# Patient Record
Sex: FEMALE | Race: BLACK OR AFRICAN AMERICAN | NOT HISPANIC OR LATINO | URBAN - METROPOLITAN AREA
[De-identification: names, ages, dates, MRNs, and addresses within clinical notes are randomized per-mention and may not be internally consistent; named-entity substitution may affect disease eponyms.]

---

## 2020-08-21 LAB
CHLAMYDIA TRACHOMATAS DNA PROBE: NEGATIVE
NEISSERIA GONORRHOEAE (GC) CULTURE: NEGATIVE
TRICHOMONAS VAGINALIS BY NAA: NEGATIVE

## 2023-08-08 PROBLEM — Z76.89 ENCOUNTER TO ESTABLISH CARE: Status: ACTIVE | Noted: 2023-08-08

## 2023-08-22 NOTE — PROGRESS NOTES
Date: 08/28/2023  Patient ID: [id number]   Chief Complaint: New Pt (Est care)    HPI:   Rome Nguyen is a 23 y.o. female who is here today to establish care and wellness visit  Patient has h/o depression x 3yrs. Does see therapist. Has not tried medications yet but would like to change environment. Also has anxiety thoughts. Does have HI thoughts when driving and denies SI thoughts. Denies plans. Does see OBGYN for bc patch but eventually would like to switch to depo shot - tolerated before. Has not taken patch in the last month.    Past Medical History:   Diagnosis Date    Depression     Marijuana use 8/28/2023     History reviewed. No pertinent surgical history.  Review of patient's allergies indicates:  No Known Allergies  Outpatient Medications Marked as Taking for the 8/28/23 encounter (Office Visit) with Antonia Ng MD   Medication Sig Dispense Refill    [DISCONTINUED] norelgestromin-ethinyl estradiol 150-35 mcg/24 hr Place 1 patch onto the skin once a week.       History reviewed. No pertinent family history.   Social History     Socioeconomic History    Marital status: Single   Occupational History    Occupation: registered  at [employer]   Tobacco Use    Smoking status: Never    Smokeless tobacco: Never   Substance and Sexual Activity    Alcohol use: Yes     Alcohol/week: 1.0 standard drink of alcohol     Types: 1 Glasses of wine per week    Drug use: Yes     Types: Marijuana     Comment: 30 min/day    Sexual activity: Yes     Partners: Female, Male     Comment: currently men     Social Determinants of Health     Financial Resource Strain: High Risk (8/28/2023)    Overall Financial Resource Strain (CARDIA)     Difficulty of Paying Living Expenses: Hard   Food Insecurity: Food Insecurity Present (8/28/2023)    Hunger Vital Sign     Worried About Running Out of Food in the Last Year: Sometimes true     Ran Out of Food in the Last Year: Sometimes true   Transportation Needs: No Transportation  Needs (8/28/2023)    PRAPARE - Transportation     Lack of Transportation (Medical): No     Lack of Transportation (Non-Medical): No   Physical Activity: Sufficiently Active (8/28/2023)    Exercise Vital Sign     Days of Exercise per Week: 5 days     Minutes of Exercise per Session: 150+ min   Stress: Stress Concern Present (8/28/2023)    [employer] - Occupational Stress Questionnaire     Feeling of Stress : To some extent   Social Connections: Moderately Isolated (8/28/2023)    Social Connection and Isolation Panel [NHANES]     Frequency of Communication with Friends and Family: More than three times a week     Frequency of Social Gatherings with Friends and Family: Once a week     Attends Yazdanism Services: 1 to 4 times per year     Active Member of Clubs or Organizations: No     Attends Club or Organization Meetings: Never     Marital Status: Never    Housing Stability: Low Risk  (8/28/2023)    Housing Stability Vital Sign     Unable to Pay for Housing in the Last Year: No     Number of Places Lived in the Last Year: 1     Unstable Housing in the Last Year: No     Patient Care Team:  Antonia Ng MD as PCP - General (Family Medicine)   Subjective:   Review of Systems   Constitutional: Negative.  Negative for fever and weight loss.   HENT:  Negative for congestion, hearing loss and sore throat.    Eyes:  Negative for blurred vision.   Respiratory:  Negative for cough and shortness of breath.    Cardiovascular:  Negative for chest pain, palpitations and leg swelling.   Gastrointestinal:  Negative for abdominal pain, blood in stool, constipation, diarrhea, nausea and vomiting.   Genitourinary:  Negative for dysuria, frequency, hematuria and urgency.   Musculoskeletal:  Negative for joint pain.   Skin:  Negative for rash.   Neurological:  Negative for weakness and headaches.   Psychiatric/Behavioral:  Positive for depression. Negative for suicidal ideas. The patient is not  "nervous/anxious and does not have insomnia.      See HPI for details  All Other ROS: Negative except as stated in HPI  Objective:   /75   Pulse 76   Temp 98.8 °F (37.1 °C)   Ht 5' 6" (1.676 m)   Wt 61.2 kg (135 lb)   LMP 08/18/2023   SpO2 98%   BMI 21.79 kg/m²   Physical Exam  Vitals reviewed.   Constitutional:       Appearance: Normal appearance.   HENT:      Head: Normocephalic and atraumatic.      Right Ear: Tympanic membrane, ear canal and external ear normal.      Left Ear: Tympanic membrane, ear canal and external ear normal.      Nose: Nose normal. No congestion or rhinorrhea.      Mouth/Throat:      Mouth: Mucous membranes are moist.      Pharynx: Oropharynx is clear.   Eyes:      General: No scleral icterus.     Extraocular Movements: Extraocular movements intact.      Conjunctiva/sclera: Conjunctivae normal.      Comments: Wears glasses   Cardiovascular:      Rate and Rhythm: Normal rate and regular rhythm.      Pulses: Normal pulses.      Heart sounds: Normal heart sounds.   Pulmonary:      Effort: Pulmonary effort is normal.      Breath sounds: Normal breath sounds.   Abdominal:      General: Abdomen is flat. Bowel sounds are normal.      Palpations: Abdomen is soft.      Tenderness: There is no abdominal tenderness.   Musculoskeletal:         General: No swelling or deformity. Normal range of motion.      Cervical back: Normal range of motion and neck supple.   Skin:     General: Skin is warm and dry.   Neurological:      Mental Status: She is alert and oriented to person, place, and time.   Psychiatric:         Mood and Affect: Mood normal.         Behavior: Behavior normal.         Thought Content: Thought content normal.         Judgment: Judgment normal.       Assessment:       ICD-10-CM ICD-9-CM   1. Encounter to establish care  Z76.89 V65.8   2. Uses birth control  Z78.9 V49.89   3. Depression, unspecified depression type  F32.A 311   4. Marijuana use  F12.90 305.20      Plan:   1. " Encounter to establish care  Overview:  Obtain routine labs  F/u for wellness      Orders:  -     CBC Auto Differential; Future; Expected date: 08/28/2023  -     Comprehensive Metabolic Panel; Future; Expected date: 08/28/2023  -     Lipid Panel; Future; Expected date: 08/28/2023  -     TSH; Future; Expected date: 08/28/2023  -     Hemoglobin A1C; Future; Expected date: 08/28/2023  -     Urinalysis; Future; Expected date: 08/28/2023  -     T4, Free; Future; Expected date: 08/28/2023  -     Vitamin D; Future; Expected date: 08/28/2023  -     Hepatitis C Antibody; Future; Expected date: 08/28/2023  -     HIV 1/2 Ag/Ab (4th Gen); Future; Expected date: 08/28/2023    2. Uses birth control  Overview:  Stop patch  Start Depo injections q3mos  Call if issues arise    Orders:  -     medroxyPROGESTERone (DEPO-PROVERA) 150 mg/mL Syrg; Inject 1 mL (150 mg total) into the muscle every 3 (three) months.  Dispense: 1 mL; Refill: 0    3. Depression, unspecified depression type  Overview:  Currently unmedicated  Continue CBT  Exercise daily. Get sunlight daily.  Practice positive phrases and repeat throughout the day, along with yoga and relaxation techniques.  Establish good social support, make changes to reduce stress.  Report any symptoms of suicidal/homicidal ideations or self harm immediately, if clinic is closed go to nearest emergency room.          4. Marijuana use  Overview:  Counseled patient on marijuana cessation and risks of continued use for 3-5min                  Follow up in about 1 year (around 8/28/2024) for Wellness. In addition to their scheduled follow up, the patient has also been instructed to follow up on as needed basis.

## 2023-08-28 ENCOUNTER — OFFICE VISIT (OUTPATIENT)
Dept: PRIMARY CARE CLINIC | Facility: CLINIC | Age: 23
End: 2023-08-28
Payer: COMMERCIAL

## 2023-08-28 VITALS
BODY MASS INDEX: 21.69 KG/M2 | HEART RATE: 76 BPM | TEMPERATURE: 99 F | DIASTOLIC BLOOD PRESSURE: 75 MMHG | SYSTOLIC BLOOD PRESSURE: 120 MMHG | HEIGHT: 66 IN | OXYGEN SATURATION: 98 % | WEIGHT: 135 LBS

## 2023-08-28 DIAGNOSIS — F32.A DEPRESSION, UNSPECIFIED DEPRESSION TYPE: ICD-10-CM

## 2023-08-28 DIAGNOSIS — F12.90 MARIJUANA USE: ICD-10-CM

## 2023-08-28 DIAGNOSIS — Z76.89 ENCOUNTER TO ESTABLISH CARE: Primary | ICD-10-CM

## 2023-08-28 DIAGNOSIS — Z78.9 USES BIRTH CONTROL: ICD-10-CM

## 2023-08-28 PROCEDURE — 99385 PREV VISIT NEW AGE 18-39: CPT | Mod: ,,, | Performed by: STUDENT IN AN ORGANIZED HEALTH CARE EDUCATION/TRAINING PROGRAM

## 2023-08-28 PROCEDURE — 1160F RVW MEDS BY RX/DR IN RCRD: CPT | Mod: CPTII,,, | Performed by: STUDENT IN AN ORGANIZED HEALTH CARE EDUCATION/TRAINING PROGRAM

## 2023-08-28 PROCEDURE — 1159F MED LIST DOCD IN RCRD: CPT | Mod: CPTII,,, | Performed by: STUDENT IN AN ORGANIZED HEALTH CARE EDUCATION/TRAINING PROGRAM

## 2023-08-28 PROCEDURE — 3008F BODY MASS INDEX DOCD: CPT | Mod: CPTII,,, | Performed by: STUDENT IN AN ORGANIZED HEALTH CARE EDUCATION/TRAINING PROGRAM

## 2023-08-28 PROCEDURE — 1159F PR MEDICATION LIST DOCUMENTED IN MEDICAL RECORD: ICD-10-PCS | Mod: CPTII,,, | Performed by: STUDENT IN AN ORGANIZED HEALTH CARE EDUCATION/TRAINING PROGRAM

## 2023-08-28 PROCEDURE — G9016 PR DEMO-SMOKING CESSATION COUN: ICD-10-PCS | Mod: ,,, | Performed by: STUDENT IN AN ORGANIZED HEALTH CARE EDUCATION/TRAINING PROGRAM

## 2023-08-28 PROCEDURE — G9016 DEMO-SMOKING CESSATION COUN: HCPCS | Mod: ,,, | Performed by: STUDENT IN AN ORGANIZED HEALTH CARE EDUCATION/TRAINING PROGRAM

## 2023-08-28 PROCEDURE — 3078F DIAST BP <80 MM HG: CPT | Mod: CPTII,,, | Performed by: STUDENT IN AN ORGANIZED HEALTH CARE EDUCATION/TRAINING PROGRAM

## 2023-08-28 PROCEDURE — 3074F SYST BP LT 130 MM HG: CPT | Mod: CPTII,,, | Performed by: STUDENT IN AN ORGANIZED HEALTH CARE EDUCATION/TRAINING PROGRAM

## 2023-08-28 PROCEDURE — 1160F PR REVIEW ALL MEDS BY PRESCRIBER/CLIN PHARMACIST DOCUMENTED: ICD-10-PCS | Mod: CPTII,,, | Performed by: STUDENT IN AN ORGANIZED HEALTH CARE EDUCATION/TRAINING PROGRAM

## 2023-08-28 PROCEDURE — 99385 PR PREVENTIVE VISIT,NEW,18-39: ICD-10-PCS | Mod: ,,, | Performed by: STUDENT IN AN ORGANIZED HEALTH CARE EDUCATION/TRAINING PROGRAM

## 2023-08-28 PROCEDURE — 3008F PR BODY MASS INDEX (BMI) DOCUMENTED: ICD-10-PCS | Mod: CPTII,,, | Performed by: STUDENT IN AN ORGANIZED HEALTH CARE EDUCATION/TRAINING PROGRAM

## 2023-08-28 PROCEDURE — 3074F PR MOST RECENT SYSTOLIC BLOOD PRESSURE < 130 MM HG: ICD-10-PCS | Mod: CPTII,,, | Performed by: STUDENT IN AN ORGANIZED HEALTH CARE EDUCATION/TRAINING PROGRAM

## 2023-08-28 PROCEDURE — 3078F PR MOST RECENT DIASTOLIC BLOOD PRESSURE < 80 MM HG: ICD-10-PCS | Mod: CPTII,,, | Performed by: STUDENT IN AN ORGANIZED HEALTH CARE EDUCATION/TRAINING PROGRAM

## 2023-08-28 RX ORDER — NORELGESTROMIN AND ETHINYL ESTRADIOL 35; 150 UG/MG; UG/MG
1 PATCH TRANSDERMAL WEEKLY
COMMUNITY
End: 2023-08-28

## 2023-08-28 RX ORDER — MEDROXYPROGESTERONE ACETATE 150 MG/ML
150 INJECTION, SUSPENSION INTRAMUSCULAR
Qty: 1 ML | Refills: 0 | Status: SHIPPED | OUTPATIENT
Start: 2023-08-28 | End: 2023-12-18 | Stop reason: SDUPTHER

## 2023-10-26 ENCOUNTER — OFFICE VISIT (OUTPATIENT)
Dept: PRIMARY CARE CLINIC | Facility: CLINIC | Age: 23
End: 2023-10-26
Payer: COMMERCIAL

## 2023-10-26 VITALS
DIASTOLIC BLOOD PRESSURE: 74 MMHG | WEIGHT: 129.81 LBS | TEMPERATURE: 99 F | BODY MASS INDEX: 20.86 KG/M2 | HEIGHT: 66 IN | OXYGEN SATURATION: 98 % | SYSTOLIC BLOOD PRESSURE: 113 MMHG | HEART RATE: 79 BPM

## 2023-10-26 DIAGNOSIS — F32.A DEPRESSION, UNSPECIFIED DEPRESSION TYPE: Primary | ICD-10-CM

## 2023-10-26 PROCEDURE — 3074F SYST BP LT 130 MM HG: CPT | Mod: CPTII,,, | Performed by: STUDENT IN AN ORGANIZED HEALTH CARE EDUCATION/TRAINING PROGRAM

## 2023-10-26 PROCEDURE — 99214 OFFICE O/P EST MOD 30 MIN: CPT | Mod: ,,, | Performed by: STUDENT IN AN ORGANIZED HEALTH CARE EDUCATION/TRAINING PROGRAM

## 2023-10-26 PROCEDURE — 3008F PR BODY MASS INDEX (BMI) DOCUMENTED: ICD-10-PCS | Mod: CPTII,,, | Performed by: STUDENT IN AN ORGANIZED HEALTH CARE EDUCATION/TRAINING PROGRAM

## 2023-10-26 PROCEDURE — 1159F MED LIST DOCD IN RCRD: CPT | Mod: CPTII,,, | Performed by: STUDENT IN AN ORGANIZED HEALTH CARE EDUCATION/TRAINING PROGRAM

## 2023-10-26 PROCEDURE — 99214 PR OFFICE/OUTPT VISIT, EST, LEVL IV, 30-39 MIN: ICD-10-PCS | Mod: ,,, | Performed by: STUDENT IN AN ORGANIZED HEALTH CARE EDUCATION/TRAINING PROGRAM

## 2023-10-26 PROCEDURE — 3008F BODY MASS INDEX DOCD: CPT | Mod: CPTII,,, | Performed by: STUDENT IN AN ORGANIZED HEALTH CARE EDUCATION/TRAINING PROGRAM

## 2023-10-26 PROCEDURE — 1160F PR REVIEW ALL MEDS BY PRESCRIBER/CLIN PHARMACIST DOCUMENTED: ICD-10-PCS | Mod: CPTII,,, | Performed by: STUDENT IN AN ORGANIZED HEALTH CARE EDUCATION/TRAINING PROGRAM

## 2023-10-26 PROCEDURE — 3078F PR MOST RECENT DIASTOLIC BLOOD PRESSURE < 80 MM HG: ICD-10-PCS | Mod: CPTII,,, | Performed by: STUDENT IN AN ORGANIZED HEALTH CARE EDUCATION/TRAINING PROGRAM

## 2023-10-26 PROCEDURE — 1160F RVW MEDS BY RX/DR IN RCRD: CPT | Mod: CPTII,,, | Performed by: STUDENT IN AN ORGANIZED HEALTH CARE EDUCATION/TRAINING PROGRAM

## 2023-10-26 PROCEDURE — 3074F PR MOST RECENT SYSTOLIC BLOOD PRESSURE < 130 MM HG: ICD-10-PCS | Mod: CPTII,,, | Performed by: STUDENT IN AN ORGANIZED HEALTH CARE EDUCATION/TRAINING PROGRAM

## 2023-10-26 PROCEDURE — 1159F PR MEDICATION LIST DOCUMENTED IN MEDICAL RECORD: ICD-10-PCS | Mod: CPTII,,, | Performed by: STUDENT IN AN ORGANIZED HEALTH CARE EDUCATION/TRAINING PROGRAM

## 2023-10-26 PROCEDURE — 3078F DIAST BP <80 MM HG: CPT | Mod: CPTII,,, | Performed by: STUDENT IN AN ORGANIZED HEALTH CARE EDUCATION/TRAINING PROGRAM

## 2023-10-26 RX ORDER — SERTRALINE HYDROCHLORIDE 25 MG/1
25 TABLET, FILM COATED ORAL DAILY
Qty: 30 TABLET | Refills: 11 | Status: SHIPPED | OUTPATIENT
Start: 2023-10-26 | End: 2024-10-25

## 2023-10-26 NOTE — PROGRESS NOTES
Date: 10/26/2023  Patient ID: [id number]   Chief Complaint: Depression (Having issues with depression for about 3 years)    HPI:   Rome Nguyen is a 23 y.o. female here today for Depression (Having issues with depression for about 3 years)  Patient has had anxiety depression over the last 3yrs since pandemic and grandmother passing, which was well controlled until recently, when patient started having passive SI thoughts. Also feels very apathetic and would like to start medication. She denies plan or HI. Denies bipolar d/o or bruno with her or family    Past Medical History:   Diagnosis Date    Depression     Marijuana use 8/28/2023     Outpatient Medications Marked as Taking for the 10/26/23 encounter (Office Visit) with Antonia Ng MD   Medication Sig Dispense Refill    medroxyPROGESTERone (DEPO-PROVERA) 150 mg/mL Syrg Inject 1 mL (150 mg total) into the muscle every 3 (three) months. 1 mL 0     History reviewed. No pertinent surgical history.  Review of patient's allergies indicates:  No Known Allergies  History reviewed. No pertinent family history.   Social History     Socioeconomic History    Marital status: Single   Occupational History    Occupation: registered  at [employer]   Tobacco Use    Smoking status: Never    Smokeless tobacco: Never   Substance and Sexual Activity    Alcohol use: Yes     Alcohol/week: 1.0 standard drink of alcohol     Types: 1 Glasses of wine per week    Drug use: Yes     Types: Marijuana     Comment: 30 min/day    Sexual activity: Yes     Partners: Female, Male     Comment: currently men     Social Determinants of Health     Financial Resource Strain: High Risk (8/28/2023)    Overall Financial Resource Strain (CARDIA)     Difficulty of Paying Living Expenses: Hard   Food Insecurity: Food Insecurity Present (8/28/2023)    Hunger Vital Sign     Worried About Running Out of Food in the Last Year: Sometimes true     Ran Out of Food in the Last Year: Sometimes true  "  Transportation Needs: No Transportation Needs (8/28/2023)    PRAPARE - Transportation     Lack of Transportation (Medical): No     Lack of Transportation (Non-Medical): No   Physical Activity: Sufficiently Active (8/28/2023)    Exercise Vital Sign     Days of Exercise per Week: 5 days     Minutes of Exercise per Session: 150+ min   Stress: Stress Concern Present (8/28/2023)    [employer] [place] [employer] - Occupational Stress Questionnaire     Feeling of Stress : To some extent   Social Connections: Moderately Isolated (8/28/2023)    Social Connection and Isolation Panel [NHANES]     Frequency of Communication with Friends and Family: More than three times a week     Frequency of Social Gatherings with Friends and Family: Once a week     Attends Sikh Services: 1 to 4 times per year     Active Member of Clubs or Organizations: No     Attends Club or Organization Meetings: Never     Marital Status: Never    Housing Stability: Low Risk  (8/28/2023)    Housing Stability Vital Sign     Unable to Pay for Housing in the Last Year: No     Number of Places Lived in the Last Year: 1     Unstable Housing in the Last Year: No     Patient Care Team:  Antonia Ng MD as PCP - General (Family Medicine)   Subjective:   ROS  See HPI for details  All Other ROS: Negative except as stated in HPI.   Objective:   /74   Pulse 79   Temp 98.7 °F (37.1 °C)   Ht 5' 6" (1.676 m)   Wt 58.9 kg (129 lb 12.8 oz)   SpO2 98%   BMI 20.95 kg/m²   Physical Exam  General: NAD  Eye: EOMI  HENT: no nasal discharge  Respiratory: non-labored breathing  Musculoskeletal: ambulates independently. No obvious deformity  Integumentary: no apparent discoloration  Neurologic: Alert, oriented to person and situation  Cognition and Speech: Speech clear and coherent.   Psychiatric: Cooperative  Assessment:       ICD-10-CM ICD-9-CM   1. Depression, unspecified depression type  F32.A 311      Plan:   1. Depression, unspecified " depression type  Overview:  Start Zoloft 25mg qd. AE discussed  Continue CBT  Exercise daily. Get sunlight daily.  Practice positive phrases and repeat throughout the day, along with yoga and relaxation techniques.  Establish good social support, make changes to reduce stress.  Report any symptoms of suicidal/homicidal ideations or self harm immediately, if clinic is closed go to nearest emergency room.        Orders:  -     sertraline (ZOLOFT) 25 MG tablet; Take 1 tablet (25 mg total) by mouth once daily.  Dispense: 30 tablet; Refill: 11         Medication List with Changes/Refills   New Medications    SERTRALINE (ZOLOFT) 25 MG TABLET    Take 1 tablet (25 mg total) by mouth once daily.       Start Date: 10/26/2023End Date: 10/25/2024   Current Medications    MEDROXYPROGESTERONE (DEPO-PROVERA) 150 MG/ML SYRG    Inject 1 mL (150 mg total) into the muscle every 3 (three) months.       Start Date: 8/28/2023 End Date: --        Follow up in about 6 weeks (around 12/7/2023) for Depression. In addition to their scheduled follow up, the patient has also been instructed to follow up on as needed basis.

## 2023-10-28 LAB
CHLAMYDIA TRACHOMATIS (PRECISION): NEGATIVE
NEISSERIA GONORRHOEAE (PRECISION): NEGATIVE
PAP RECOMMENDATION EXT: NORMAL
PAP SMEAR: NORMAL
TRICHOMONAS VAGINALIS (PRECISION): NEGATIVE

## 2023-11-28 PROBLEM — F33.9 DEPRESSION, RECURRENT: Status: ACTIVE | Noted: 2023-08-28

## 2023-12-14 NOTE — PROGRESS NOTES
Date: 12/18/2023  Patient ID: [id number]   Chief Complaint: Follow-up (Follow up on medication zoloft, states hasn't really noticed a difference)    HPI:   Rome Nguyen is a 23 y.o. female here today for Follow-up (Follow up on medication zoloft, states hasn't really noticed a difference)  Last visit Zoloft was started for depression. Notices more energy and improved mood/depression. Denies SI/HI/hallucinations However, energy is still increased at night and gets to sleep around 2am and wakes up 2-3x throughout the night. Able to fall asleep easily though. Takes Zoloft 8-9am.    Also, patient has congestion intermittently. Tried Mucinex, and other otc meds. Denies sore throat. Works with kids.Denies fevers, chills, chest pain, sob    Past Medical History:   Diagnosis Date    Depression     Marijuana use 8/28/2023     Outpatient Medications Marked as Taking for the 12/18/23 encounter (Office Visit) with Antonia Ng MD   Medication Sig Dispense Refill    sertraline (ZOLOFT) 25 MG tablet Take 1 tablet (25 mg total) by mouth once daily. 30 tablet 11    [DISCONTINUED] medroxyPROGESTERone (DEPO-PROVERA) 150 mg/mL Syrg Inject 1 mL (150 mg total) into the muscle every 3 (three) months. 1 mL 0     History reviewed. No pertinent surgical history.  Review of patient's allergies indicates:  No Known Allergies  History reviewed. No pertinent family history.   Social History     Socioeconomic History    Marital status: Single   Occupational History    Occupation: registered  at [employer]   Tobacco Use    Smoking status: Never    Smokeless tobacco: Never   Substance and Sexual Activity    Alcohol use: Yes     Alcohol/week: 1.0 standard drink of alcohol     Types: 1 Glasses of wine per week    Drug use: Yes     Types: Marijuana     Comment: 30 min/day    Sexual activity: Yes     Partners: Female, Male     Comment: currently men     Social Determinants of Health     Financial Resource Strain: High Risk (8/28/2023)  "   Overall Financial Resource Strain (CARDIA)     Difficulty of Paying Living Expenses: Hard   Food Insecurity: Food Insecurity Present (8/28/2023)    Hunger Vital Sign     Worried About Running Out of Food in the Last Year: Sometimes true     Ran Out of Food in the Last Year: Sometimes true   Transportation Needs: No Transportation Needs (8/28/2023)    PRAPARE - Transportation     Lack of Transportation (Medical): No     Lack of Transportation (Non-Medical): No   Physical Activity: Sufficiently Active (8/28/2023)    Exercise Vital Sign     Days of Exercise per Week: 5 days     Minutes of Exercise per Session: 150+ min   Stress: Stress Concern Present (8/28/2023)    [employer] [place] [employer] - Occupational Stress Questionnaire     Feeling of Stress : To some extent   Social Connections: Moderately Isolated (8/28/2023)    Social Connection and Isolation Panel [NHANES]     Frequency of Communication with Friends and Family: More than three times a week     Frequency of Social Gatherings with Friends and Family: Once a week     Attends Jewish Services: 1 to 4 times per year     Active Member of Clubs or Organizations: No     Attends Club or Organization Meetings: Never     Marital Status: Never    Housing Stability: Low Risk  (8/28/2023)    Housing Stability Vital Sign     Unable to Pay for Housing in the Last Year: No     Number of Places Lived in the Last Year: 1     Unstable Housing in the Last Year: No     Patient Care Team:  Antonia Ng MD as PCP - General (Family Medicine)   Subjective:   ROS  See HPI for details  All Other ROS: Negative except as stated in HPI.   Objective:   /79   Pulse 67   Temp 98.7 °F (37.1 °C)   Ht 5' 6" (1.676 m)   Wt 59.1 kg (130 lb 6.4 oz)   SpO2 99%   BMI 21.05 kg/m²   Physical Exam  HENT:      Right Ear: Tympanic membrane and ear canal normal. There is no impacted cerumen.      Left Ear: Tympanic membrane and ear canal normal. There is no " impacted cerumen.      Nose: Congestion present. No rhinorrhea.      Mouth/Throat:      Comments: Cobblestoning in pharynx  Cardiovascular:      Rate and Rhythm: Normal rate and regular rhythm.      Heart sounds: Normal heart sounds.   Pulmonary:      Effort: Pulmonary effort is normal.      Breath sounds: Normal breath sounds. No wheezing or rhonchi.   Lymphadenopathy:      Cervical: No cervical adenopathy.   Neurological:      Mental Status: She is alert and oriented to person, place, and time.   Psychiatric:         Mood and Affect: Mood normal.         Behavior: Behavior normal.         Thought Content: Thought content normal.         Judgment: Judgment normal.       Assessment:       ICD-10-CM ICD-9-CM   1. Depression, recurrent  F33.9 296.30   2. Insomnia, unspecified type  G47.00 780.52   3. Uses birth control  Z78.9 V49.89   4. Postnasal drip  R09.82 784.91      Plan:   1. Depression, recurrent  Assessment & Plan:  Attempt Melatonin at night   Consider switching Zoloft at night   Consider switching to lexapro if above does not help with insomnia      2. Insomnia, unspecified type  Assessment & Plan:  Melatonin prn  Go to bed at same night every night, get up same time every day, avoid sleeping in  Keep temperature comfortable  Keep bedroom quiet and dark when sleeping  No tv or screen 1 hour before bed  Relaxation techniques: Meditation, progressive muscle relaxation  Regular exercise every day  Warm herbal tea at night  Avoid caffeine and overstimulated activities after 4pm / prior to bedtime  Avoid alcohol  Avoid drinking excessive fluids prior to sleep      Orders:  -     melatonin (MELATIN) 5 mg; Take 1 tablet (5 mg total) by mouth every evening.  Dispense: 30 tablet; Refill: 3    3. Uses birth control  Assessment & Plan:  Stable. Continue depo q3mos    Orders:  -     medroxyPROGESTERone (DEPO-PROVERA) 150 mg/mL Syrg; Inject 1 mL (150 mg total) into the muscle every 3 (three) months.  Dispense: 1 mL;  Refill: 6    4. Postnasal drip  Assessment & Plan:  Start Flonase and Claritin. Call if sx worsen    Orders:  -     fluticasone propionate (FLONASE) 50 mcg/actuation nasal spray; 1 spray (50 mcg total) by Each Nostril route once daily.  Dispense: 18.2 g; Refill: 6  -     loratadine (CLARITIN) 10 mg tablet; Take 1 tablet (10 mg total) by mouth once daily.  Dispense: 90 tablet; Refill: 3         Medication List with Changes/Refills   New Medications    FLUTICASONE PROPIONATE (FLONASE) 50 MCG/ACTUATION NASAL SPRAY    1 spray (50 mcg total) by Each Nostril route once daily.       Start Date: 12/18/2023End Date: --    LORATADINE (CLARITIN) 10 MG TABLET    Take 1 tablet (10 mg total) by mouth once daily.       Start Date: 12/18/2023End Date: 12/17/2024    MELATONIN (MELATIN) 5 MG    Take 1 tablet (5 mg total) by mouth every evening.       Start Date: 12/18/2023End Date: --   Current Medications    SERTRALINE (ZOLOFT) 25 MG TABLET    Take 1 tablet (25 mg total) by mouth once daily.       Start Date: 10/26/2023End Date: 10/25/2024   Changed and/or Refilled Medications    Modified Medication Previous Medication    MEDROXYPROGESTERONE (DEPO-PROVERA) 150 MG/ML SYRG medroxyPROGESTERone (DEPO-PROVERA) 150 mg/mL Syrg       Inject 1 mL (150 mg total) into the muscle every 3 (three) months.    Inject 1 mL (150 mg total) into the muscle every 3 (three) months.       Start Date: 12/18/2023End Date: --    Start Date: 8/28/2023 End Date: 12/18/2023        Follow up in about 3 weeks (around 1/8/2024) for telemed if pt wants, Depression. In addition to their scheduled follow up, the patient has also been instructed to follow up on as needed basis.

## 2023-12-18 ENCOUNTER — OFFICE VISIT (OUTPATIENT)
Dept: PRIMARY CARE CLINIC | Facility: CLINIC | Age: 23
End: 2023-12-18
Payer: COMMERCIAL

## 2023-12-18 VITALS
DIASTOLIC BLOOD PRESSURE: 79 MMHG | HEART RATE: 67 BPM | WEIGHT: 130.38 LBS | SYSTOLIC BLOOD PRESSURE: 116 MMHG | OXYGEN SATURATION: 99 % | BODY MASS INDEX: 20.95 KG/M2 | HEIGHT: 66 IN | TEMPERATURE: 99 F

## 2023-12-18 DIAGNOSIS — Z78.9 USES BIRTH CONTROL: ICD-10-CM

## 2023-12-18 DIAGNOSIS — R09.82 POSTNASAL DRIP: ICD-10-CM

## 2023-12-18 DIAGNOSIS — F33.9 DEPRESSION, RECURRENT: Primary | ICD-10-CM

## 2023-12-18 DIAGNOSIS — G47.00 INSOMNIA, UNSPECIFIED TYPE: ICD-10-CM

## 2023-12-18 PROCEDURE — 99214 PR OFFICE/OUTPT VISIT, EST, LEVL IV, 30-39 MIN: ICD-10-PCS | Mod: ,,, | Performed by: STUDENT IN AN ORGANIZED HEALTH CARE EDUCATION/TRAINING PROGRAM

## 2023-12-18 PROCEDURE — 1159F MED LIST DOCD IN RCRD: CPT | Mod: CPTII,,, | Performed by: STUDENT IN AN ORGANIZED HEALTH CARE EDUCATION/TRAINING PROGRAM

## 2023-12-18 PROCEDURE — 3074F PR MOST RECENT SYSTOLIC BLOOD PRESSURE < 130 MM HG: ICD-10-PCS | Mod: CPTII,,, | Performed by: STUDENT IN AN ORGANIZED HEALTH CARE EDUCATION/TRAINING PROGRAM

## 2023-12-18 PROCEDURE — 3078F PR MOST RECENT DIASTOLIC BLOOD PRESSURE < 80 MM HG: ICD-10-PCS | Mod: CPTII,,, | Performed by: STUDENT IN AN ORGANIZED HEALTH CARE EDUCATION/TRAINING PROGRAM

## 2023-12-18 PROCEDURE — 3078F DIAST BP <80 MM HG: CPT | Mod: CPTII,,, | Performed by: STUDENT IN AN ORGANIZED HEALTH CARE EDUCATION/TRAINING PROGRAM

## 2023-12-18 PROCEDURE — 1160F PR REVIEW ALL MEDS BY PRESCRIBER/CLIN PHARMACIST DOCUMENTED: ICD-10-PCS | Mod: CPTII,,, | Performed by: STUDENT IN AN ORGANIZED HEALTH CARE EDUCATION/TRAINING PROGRAM

## 2023-12-18 PROCEDURE — 1159F PR MEDICATION LIST DOCUMENTED IN MEDICAL RECORD: ICD-10-PCS | Mod: CPTII,,, | Performed by: STUDENT IN AN ORGANIZED HEALTH CARE EDUCATION/TRAINING PROGRAM

## 2023-12-18 PROCEDURE — 1160F RVW MEDS BY RX/DR IN RCRD: CPT | Mod: CPTII,,, | Performed by: STUDENT IN AN ORGANIZED HEALTH CARE EDUCATION/TRAINING PROGRAM

## 2023-12-18 PROCEDURE — 3074F SYST BP LT 130 MM HG: CPT | Mod: CPTII,,, | Performed by: STUDENT IN AN ORGANIZED HEALTH CARE EDUCATION/TRAINING PROGRAM

## 2023-12-18 PROCEDURE — 3008F BODY MASS INDEX DOCD: CPT | Mod: CPTII,,, | Performed by: STUDENT IN AN ORGANIZED HEALTH CARE EDUCATION/TRAINING PROGRAM

## 2023-12-18 PROCEDURE — 99214 OFFICE O/P EST MOD 30 MIN: CPT | Mod: ,,, | Performed by: STUDENT IN AN ORGANIZED HEALTH CARE EDUCATION/TRAINING PROGRAM

## 2023-12-18 PROCEDURE — 3008F PR BODY MASS INDEX (BMI) DOCUMENTED: ICD-10-PCS | Mod: CPTII,,, | Performed by: STUDENT IN AN ORGANIZED HEALTH CARE EDUCATION/TRAINING PROGRAM

## 2023-12-18 RX ORDER — ACETAMINOPHEN 500 MG
6 TABLET ORAL NIGHTLY
Qty: 30 TABLET | Refills: 3 | Status: SHIPPED | OUTPATIENT
Start: 2023-12-18

## 2023-12-18 RX ORDER — FLUTICASONE PROPIONATE 50 MCG
1 SPRAY, SUSPENSION (ML) NASAL DAILY
Qty: 18.2 G | Refills: 6 | Status: SHIPPED | OUTPATIENT
Start: 2023-12-18

## 2023-12-18 RX ORDER — MEDROXYPROGESTERONE ACETATE 150 MG/ML
150 INJECTION, SUSPENSION INTRAMUSCULAR
Qty: 1 ML | Refills: 6 | Status: SHIPPED | OUTPATIENT
Start: 2023-12-18

## 2023-12-18 RX ORDER — LORATADINE 10 MG/1
10 TABLET ORAL DAILY
Qty: 90 TABLET | Refills: 3 | Status: SHIPPED | OUTPATIENT
Start: 2023-12-18 | End: 2024-12-17

## 2023-12-18 NOTE — ASSESSMENT & PLAN NOTE
Melatonin prn  Go to bed at same night every night, get up same time every day, avoid sleeping in  Keep temperature comfortable  Keep bedroom quiet and dark when sleeping  No tv or screen 1 hour before bed  Relaxation techniques: Meditation, progressive muscle relaxation  Regular exercise every day  Warm herbal tea at night  Avoid caffeine and overstimulated activities after 4pm / prior to bedtime  Avoid alcohol  Avoid drinking excessive fluids prior to sleep

## 2023-12-18 NOTE — ASSESSMENT & PLAN NOTE
Attempt Melatonin at night   Consider switching Zoloft at night   Consider switching to lexapro if above does not help with insomnia

## 2024-01-25 ENCOUNTER — PATIENT OUTREACH (OUTPATIENT)
Dept: ADMINISTRATIVE | Facility: HOSPITAL | Age: 24
End: 2024-01-25
Payer: COMMERCIAL

## 2024-01-25 ENCOUNTER — DOCUMENTATION ONLY (OUTPATIENT)
Dept: ADMINISTRATIVE | Facility: HOSPITAL | Age: 24
End: 2024-01-25
Payer: COMMERCIAL

## 2024-01-25 NOTE — PROGRESS NOTES
The following record(s)  below were uploaded for Health Maintenance .    CHLAMYDIA SCREENING  2020      Population [facility]. Pap smear in 2023 records requested.  Dr. ABENA Rico

## 2024-05-14 NOTE — PROGRESS NOTES
Date: 05/16/2024  Patient ID: [id number]   Chief Complaint: Med Change    HPI:   Rome Nguyen is a 23 y.o. female here today for Med Change  Patient had increased Zoloft to 50mg qd, which improved depression and energy. Sleep has improved since above has improved.    Patient Active Problem List   Diagnosis    Encounter to establish care    Uses birth control    Marijuana use    Insomnia    Postnasal drip    Major depressive disorder, single episode, severe     Outpatient Medications Marked as Taking for the 5/16/24 encounter (Office Visit) with Antonia Ng MD   Medication Sig Dispense Refill    medroxyPROGESTERone (DEPO-PROVERA) 150 mg/mL Syrg Inject 1 mL (150 mg total) into the muscle every 3 (three) months. 1 mL 6    melatonin (MELATIN) 5 mg Take 1 tablet (5 mg total) by mouth every evening. 30 tablet 3    [DISCONTINUED] sertraline (ZOLOFT) 25 MG tablet Take 1 tablet (25 mg total) by mouth once daily. 30 tablet 11     Past Medical History:   Diagnosis Date    Depression     Marijuana use 8/28/2023     History reviewed. No pertinent surgical history.  Review of patient's allergies indicates:  No Known Allergies  No family history on file.   Social History     Socioeconomic History    Marital status: Single   Occupational History    Occupation: registered  at [employer]   Tobacco Use    Smoking status: Never    Smokeless tobacco: Never   Substance and Sexual Activity    Alcohol use: Yes     Alcohol/week: 1.0 standard drink of alcohol     Types: 1 Glasses of wine per week    Drug use: Yes     Types: Marijuana     Comment: 30 min/day    Sexual activity: Yes     Partners: Female, Male     Comment: currently men     Social Determinants of Health     Financial Resource Strain: High Risk (8/28/2023)    Overall Financial Resource Strain (CARDIA)     Difficulty of Paying Living Expenses: Hard   Food Insecurity: Food Insecurity Present (8/28/2023)    Hunger Vital Sign     Worried About Running Out of Food  "in the Last Year: Sometimes true     Ran Out of Food in the Last Year: Sometimes true   Transportation Needs: No Transportation Needs (8/28/2023)    PRAPARE - Transportation     Lack of Transportation (Medical): No     Lack of Transportation (Non-Medical): No   Physical Activity: Sufficiently Active (8/28/2023)    Exercise Vital Sign     Days of Exercise per Week: 5 days     Minutes of Exercise per Session: 150+ min   Stress: Stress Concern Present (8/28/2023)    [facility] - Occupational Stress Questionnaire     Feeling of Stress : To some extent   Housing Stability: Low Risk  (8/28/2023)    Housing Stability Vital Sign     Unable to Pay for Housing in the Last Year: No     Number of Places Lived in the Last Year: 1     Unstable Housing in the Last Year: No     Patient Care Team:  Antonia Ng MD as PCP - General (Family Medicine)  MYLA Rico NP as Nurse Practitioner (Obstetrics and Gynecology)   Subjective:   ROS  See HPI for details  All Other ROS: Negative except as stated in HPI.   Objective:   /84 (BP Location: Left arm, Patient Position: Sitting, BP Method: Large (Automatic))   Pulse 81   Temp 98.8 °F (37.1 °C)   Resp 18   Ht 5' 6" (1.676 m)   Wt 59.4 kg (131 lb)   LMP 05/01/2024   SpO2 99%   BMI 21.14 kg/m²   Physical Exam  General: NAD  Eye: EOMI  HENT: no nasal discharge  Respiratory: non-labored breathing  Musculoskeletal: ambulates independently. No obvious deformity  Integumentary: no apparent discoloration  Neurologic: Alert, oriented to person and situation  Cognition and Speech: Speech clear and coherent.   Psychiatric: Cooperative. Smiling    Assessment:       ICD-10-CM ICD-9-CM   1. Major depressive disorder, single episode, severe  F32.2 296.23   2. Wellness examination  Z00.00 V70.0      Plan:   1. Major depressive disorder, single episode, severe  Assessment & Plan:  Significantly improved and stable  Continue Zoloft 50 mg " daily    Orders:  -     sertraline (ZOLOFT) 50 MG tablet; Take 1 tablet (50 mg total) by mouth once daily.  Dispense: 90 tablet; Refill: 3  -     Comprehensive Metabolic Panel; Future; Expected date: 05/16/2024  -     Lipid Panel; Future; Expected date: 05/16/2024    2. Wellness examination  -     Comprehensive Metabolic Panel; Future; Expected date: 05/16/2024  -     Lipid Panel; Future; Expected date: 05/16/2024         Medication List with Changes/Refills   Current Medications    FLUTICASONE PROPIONATE (FLONASE) 50 MCG/ACTUATION NASAL SPRAY    1 spray (50 mcg total) by Each Nostril route once daily.       Start Date: 12/18/2023End Date: --    LORATADINE (CLARITIN) 10 MG TABLET    Take 1 tablet (10 mg total) by mouth once daily.       Start Date: 12/18/2023End Date: 12/17/2024    MEDROXYPROGESTERONE (DEPO-PROVERA) 150 MG/ML SYRG    Inject 1 mL (150 mg total) into the muscle every 3 (three) months.       Start Date: 12/18/2023End Date: --    MELATONIN (MELATIN) 5 MG    Take 1 tablet (5 mg total) by mouth every evening.       Start Date: 12/18/2023End Date: --   Changed and/or Refilled Medications    Modified Medication Previous Medication    SERTRALINE (ZOLOFT) 50 MG TABLET sertraline (ZOLOFT) 25 MG tablet       Take 1 tablet (50 mg total) by mouth once daily.    Take 1 tablet (25 mg total) by mouth once daily.       Start Date: 5/16/2024 End Date: 5/16/2025    Start Date: 10/26/2023End Date: 5/16/2024        Follow up in about 6 months (around 11/16/2024) for Wellness. In addition to their scheduled follow up, the patient has also been instructed to follow up on as needed basis.

## 2024-05-16 ENCOUNTER — OFFICE VISIT (OUTPATIENT)
Dept: PRIMARY CARE CLINIC | Facility: CLINIC | Age: 24
End: 2024-05-16
Payer: COMMERCIAL

## 2024-05-16 VITALS
HEIGHT: 66 IN | WEIGHT: 131 LBS | RESPIRATION RATE: 18 BRPM | HEART RATE: 81 BPM | DIASTOLIC BLOOD PRESSURE: 84 MMHG | SYSTOLIC BLOOD PRESSURE: 127 MMHG | TEMPERATURE: 99 F | BODY MASS INDEX: 21.05 KG/M2 | OXYGEN SATURATION: 99 %

## 2024-05-16 DIAGNOSIS — F32.2 MAJOR DEPRESSIVE DISORDER, SINGLE EPISODE, SEVERE: Primary | ICD-10-CM

## 2024-05-16 DIAGNOSIS — Z00.00 WELLNESS EXAMINATION: ICD-10-CM

## 2024-05-16 PROBLEM — F33.9 DEPRESSION, RECURRENT: Status: RESOLVED | Noted: 2023-08-28 | Resolved: 2024-05-16

## 2024-05-16 PROCEDURE — 1160F RVW MEDS BY RX/DR IN RCRD: CPT | Mod: CPTII,,, | Performed by: STUDENT IN AN ORGANIZED HEALTH CARE EDUCATION/TRAINING PROGRAM

## 2024-05-16 PROCEDURE — 99213 OFFICE O/P EST LOW 20 MIN: CPT | Mod: ,,, | Performed by: STUDENT IN AN ORGANIZED HEALTH CARE EDUCATION/TRAINING PROGRAM

## 2024-05-16 PROCEDURE — 1159F MED LIST DOCD IN RCRD: CPT | Mod: CPTII,,, | Performed by: STUDENT IN AN ORGANIZED HEALTH CARE EDUCATION/TRAINING PROGRAM

## 2024-05-16 PROCEDURE — 3008F BODY MASS INDEX DOCD: CPT | Mod: CPTII,,, | Performed by: STUDENT IN AN ORGANIZED HEALTH CARE EDUCATION/TRAINING PROGRAM

## 2024-05-16 PROCEDURE — 3074F SYST BP LT 130 MM HG: CPT | Mod: CPTII,,, | Performed by: STUDENT IN AN ORGANIZED HEALTH CARE EDUCATION/TRAINING PROGRAM

## 2024-05-16 PROCEDURE — 3079F DIAST BP 80-89 MM HG: CPT | Mod: CPTII,,, | Performed by: STUDENT IN AN ORGANIZED HEALTH CARE EDUCATION/TRAINING PROGRAM

## 2024-05-16 RX ORDER — SERTRALINE HYDROCHLORIDE 50 MG/1
50 TABLET, FILM COATED ORAL DAILY
Qty: 90 TABLET | Refills: 3 | Status: SHIPPED | OUTPATIENT
Start: 2024-05-16 | End: 2025-05-16

## 2024-08-22 PROBLEM — Z00.00 WELLNESS EXAMINATION: Status: ACTIVE | Noted: 2024-08-22

## 2025-02-18 ENCOUNTER — OFFICE VISIT (OUTPATIENT)
Dept: URGENT CARE | Facility: CLINIC | Age: 25
End: 2025-02-18
Payer: COMMERCIAL

## 2025-02-18 VITALS
BODY MASS INDEX: 21.05 KG/M2 | OXYGEN SATURATION: 97 % | HEART RATE: 89 BPM | TEMPERATURE: 98 F | SYSTOLIC BLOOD PRESSURE: 112 MMHG | RESPIRATION RATE: 18 BRPM | DIASTOLIC BLOOD PRESSURE: 76 MMHG | WEIGHT: 131 LBS | HEIGHT: 66 IN

## 2025-02-18 DIAGNOSIS — J10.1 INFLUENZA A: ICD-10-CM

## 2025-02-18 DIAGNOSIS — J02.9 SORE THROAT: Primary | ICD-10-CM

## 2025-02-18 LAB
CTP QC/QA: YES
MOLECULAR STREP A: NEGATIVE

## 2025-02-19 NOTE — PROGRESS NOTES
"Subjective:      Patient ID: Rome Nguyen is a 24 y.o. female.    Vitals:  height is 5' 6" (1.676 m) and weight is 59.4 kg (131 lb). Her temperature is 98.2 °F (36.8 °C). Her blood pressure is 112/76 and her pulse is 89. Her respiration is 18 and oxygen saturation is 97%.     Chief Complaint: Cough    24 y.o. female presents to clinic [facility] c/o sore throat and cough starting Friday. At home flu test + on Saturday. Pt has been taking tylenol and promethazine- mod relief.       Constitution: Negative.   HENT:  Positive for sore throat.    Neck: neck negative.   Cardiovascular: Negative.    Eyes: Negative.    Respiratory: Negative.     Gastrointestinal: Negative.    Endocrine: negative.   Genitourinary: Negative.    Musculoskeletal: Negative.    Skin: Negative.    Allergic/Immunologic: Negative.    Neurological: Negative.    Hematologic/Lymphatic: Negative.    Psychiatric/Behavioral: Negative.        Objective:     Physical Exam   Constitutional: She is oriented to person, place, and time. She appears well-developed. She is cooperative. No distress.   HENT:   Head: Normocephalic and atraumatic.   Ears:   Right Ear: Tympanic membrane, external ear and ear canal normal.   Left Ear: Tympanic membrane, external ear and ear canal normal.   Nose: Congestion present.   Mouth/Throat: Oropharynx is clear and moist and mucous membranes are normal. Mucous membranes are moist. No posterior oropharyngeal erythema.   Eyes: Conjunctivae and lids are normal.   Neck: Trachea normal and phonation normal. Neck supple.   Cardiovascular: Normal rate, regular rhythm, normal heart sounds and normal pulses.   Pulmonary/Chest: Effort normal and breath sounds normal.   Abdominal: Normal appearance and bowel sounds are normal. She exhibits no mass. Soft.   Musculoskeletal:         General: No deformity.   Lymphadenopathy:     She has no cervical adenopathy.   Neurological: She is alert and oriented to person, place, and time. She has normal " "strength and normal reflexes. No sensory deficit.   Skin: Skin is warm, dry, intact and not diaphoretic. Capillary refill takes less than 2 seconds.   Psychiatric: Her speech is normal and behavior is normal. Judgment and thought content normal.   Nursing note and vitals reviewed.       Previous History      Review of patient's allergies indicates:  No Known Allergies    Past Medical History:   Diagnosis Date    Depression     Marijuana use 8/28/2023     Current Outpatient Medications   Medication Instructions    fluticasone propionate (FLONASE) 50 mcg, Each Nostril, Daily    loratadine (CLARITIN) 10 mg, Oral, Daily    medroxyPROGESTERone (DEPO-PROVERA) 150 mg, Intramuscular, Every 3 months    melatonin (MELATIN) 5 mg, Oral, Nightly    sertraline (ZOLOFT) 50 mg, Oral, Daily     History reviewed. No pertinent surgical history.  No family history on file.    Social History[1]     Physical Exam      Vital Signs Reviewed   /76   Pulse 89   Temp 98.2 °F (36.8 °C)   Resp 18   Ht 5' 6" (1.676 m)   Wt 59.4 kg (131 lb)   LMP 02/17/2025   SpO2 97%   BMI 21.14 kg/m²        Procedures    Procedures     Labs     Results for orders placed or performed in visit on 02/18/25   POCT Strep A, Molecular    Collection Time: 02/18/25  6:44 PM   Result Value Ref Range    Molecular Strep A, POC Negative Negative     Acceptable Yes        Assessment:     1. Sore throat    2. Influenza A        Plan:   Instructions:            Vitamin-C 1000 mg twice a day  Zinc 50 mg once a day  Increase fluid intake. Monitor for fever. Take tylenol/acetaminophen or advil/ibuprofen as needed for headache, bodyaches or fever.   Treat your symptoms like the common cold, take Delysm/dimetapp/robitussin as needed for cough, claritin, flonase, mucinex for congestion, for example.   Complications for flu include pneumonia, bronchitis, and sinusitis.   Stay home until you are fever free for at least 24 hours without the use of fever " reducing medication and your symptoms have improved.   If your symptoms worsen, or you develop shortness of breath, worsening of cough, or fever over 103, seek medical attention immediately.      Sore throat  -     POCT Strep A, Molecular    Influenza A                         [1]   Social History  Tobacco Use    Smoking status: Never    Smokeless tobacco: Never   Substance Use Topics    Alcohol use: Yes     Alcohol/week: 1.0 standard drink of alcohol     Types: 1 Glasses of wine per week    Drug use: Yes     Types: Marijuana     Comment: 30 min/day

## 2025-02-19 NOTE — PATIENT INSTRUCTIONS
Instructions:        Vitamin-C 1000 mg twice a day  Zinc 50 mg once a day  Increase fluid intake. Monitor for fever. Take tylenol/acetaminophen or advil/ibuprofen as needed for headache, bodyaches or fever.   Treat your symptoms like the common cold, take Delysm/dimetapp/robitussin as needed for cough, claritin, flonase, mucinex for congestion, for example.   Complications for flu include pneumonia, bronchitis, and sinusitis.   Stay home until you are fever free for at least 24 hours without the use of fever reducing medication and your symptoms have improved.   If your symptoms worsen, or you develop shortness of breath, worsening of cough, or fever over 103, seek medical attention immediately.